# Patient Record
Sex: MALE | Race: ASIAN | Employment: FULL TIME | URBAN - METROPOLITAN AREA
[De-identification: names, ages, dates, MRNs, and addresses within clinical notes are randomized per-mention and may not be internally consistent; named-entity substitution may affect disease eponyms.]

---

## 2017-02-02 ENCOUNTER — OFFICE VISIT (OUTPATIENT)
Dept: URGENT CARE | Facility: RETAIL CLINIC | Age: 42
End: 2017-02-02

## 2017-02-02 VITALS
OXYGEN SATURATION: 97 % | TEMPERATURE: 98.2 F | SYSTOLIC BLOOD PRESSURE: 148 MMHG | DIASTOLIC BLOOD PRESSURE: 95 MMHG | HEART RATE: 64 BPM

## 2017-02-02 DIAGNOSIS — J01.90 ACUTE SINUSITIS WITH COEXISTING CONDITION REQUIRING PROPHYLACTIC TREATMENT: Primary | ICD-10-CM

## 2017-02-02 PROCEDURE — 99203 OFFICE O/P NEW LOW 30 MIN: CPT | Performed by: PHYSICIAN ASSISTANT

## 2017-02-02 RX ORDER — DOXYCYCLINE HYCLATE 100 MG
100 TABLET ORAL 2 TIMES DAILY
Qty: 14 TABLET | Refills: 0 | Status: SHIPPED | OUTPATIENT
Start: 2017-02-02 | End: 2017-02-09

## 2017-02-02 RX ORDER — PREDNISONE 20 MG/1
40 TABLET ORAL DAILY
Qty: 10 TABLET | Refills: 0 | Status: SHIPPED | OUTPATIENT
Start: 2017-02-02 | End: 2017-02-07

## 2017-02-02 NOTE — PROGRESS NOTES
Chief Complaint   Patient presents with     Sinus Problem     1 week; pressure left side of face, head; had similar symptoms 4 weeks ago, improved with no office visit, treatment     Otalgia     Left; aches     SUBJECTIVE:  Norman Pardo is a 41 year old male here with concerns about sinus infection.  He states onset of symptoms was 1 week ago.    Course of illness is worsening.   Severity moderate  He has had maxillary pressure as well as left face pain, left ear pain, headache.  Predisposing factors include history of recent illness but resolved on it's own. Quit smoking 1 month ago.  Recent treatment has included: Antihistamine, Decongestants and OTC meds    Past Medical History   Diagnosis Date     NO ACTIVE PROBLEMS      Current Outpatient Prescriptions   Medication Sig Dispense Refill     doxycycline (VIBRA-TABS) 100 MG tablet Take 1 tablet (100 mg) by mouth 2 times daily for 7 days 14 tablet 0     predniSONE (DELTASONE) 20 MG tablet Take 2 tablets (40 mg) by mouth daily for 5 days 10 tablet 0     Ibuprofen (ADVIL PO) Take 400 mg by mouth       Phenyleph-Doxylamine-DM-APAP (JUD-SELTZER PLS ALLERGY & CGH PO)        Social History   Substance Use Topics     Smoking status: Former Smoker -- 1.00 packs/day     Types: Cigarettes     Smokeless tobacco: Never Used     Alcohol Use: Yes      Comment: Ocassional     Allergies   Allergen Reactions     Erythromycin Swelling     Penicillins Swelling     ROS:  Review of systems negative except as stated above.    OBJECTIVE:  /95 mmHg  Pulse 64  Temp(Src) 98.2  F (36.8  C) (Oral)  SpO2 97%  GENERAL APPEARANCE: healthy, alert and no distress  EYES: PERRL, conjunctiva clear  HENT: Pain with palpation over Left frontal and maxillary sinuses. Ear canals normal TMs with mild serous effusions bilaterally. Nasal turbinates edematous bilaterally. Posterior pharynx is not erythematous.  NECK: supple, nontender, no lymphadenopathy  RESP: lungs clear to auscultation - no  rales, rhonchi or wheezes  CV: regular rates and rhythm, normal S1 S2, no murmur noted    ASSESSMENT:    ICD-10-CM    1. Acute sinusitis with coexisting condition requiring prophylactic treatment J01.90 doxycycline (VIBRA-TABS) 100 MG tablet     predniSONE (DELTASONE) 20 MG tablet     PLAN:   Patient Instructions   Doxycycline twice daily for 7 days.  Prednisone 40mg daily for 5 days.  Flonase 2 sprays in each nostril daily until symptoms resolve, then continue 1 spray in each nostril for at least 5 more days.  Take Tylenol or an NSAID such as ibuprofen or naproxen as needed for pain.  May use netti pot with bottled or distilled water and saline packets to flush sinuses.  Avoid sudafed or other decongestants as these raise your blood pressure.  Mucinex (guiafenesin) thins mucus and may help it to loosen more quickly  Saline drops or nasal sprays may loosen mucus.  Sit in the bathroom with the door closed and hot shower running to loosen mucus.  Contact primary care clinic if you do not have any relief from your symptoms after 10 days.  Present to emergency room for significantly increasing pain, persistent high fever >102F, swelling/redness around your eyes, changes in your vision or ability to move your eyes, altered mental status or a severe headache.    Follow up with primary care provider with any problems, questions or concerns or if symptoms worsen or fail to improve. Patient agreed to plan and verbalized understanding.    Dana Rivas PA-C  [facility] [place]

## 2017-02-02 NOTE — MR AVS SNAPSHOT
After Visit Summary   2/2/2017    Norman Pardo    MRN: [id number]           Patient Information     Date Of Birth          1975        Visit Information        Provider Department      2/2/2017 12:30 PM Anna Rivas PA-C [facility]        Today's Diagnoses     Acute sinusitis with coexisting condition requiring prophylactic treatment    -  1       Care Instructions    Doxycycline twice daily for 7 days.  Prednisone 40mg daily for 5 days.  Flonase 2 sprays in each nostril daily until symptoms resolve, then continue 1 spray in each nostril for at least 5 more days.  Take Tylenol or an NSAID such as ibuprofen or naproxen as needed for pain.  May use netti pot with bottled or distilled water and saline packets to flush sinuses.  Avoid sudafed or other decongestants as these raise your blood pressure.  Mucinex (guiafenesin) thins mucus and may help it to loosen more quickly  Saline drops or nasal sprays may loosen mucus.  Sit in the bathroom with the door closed and hot shower running to loosen mucus.  Contact primary care clinic if you do not have any relief from your symptoms after 10 days.  Present to emergency room for significantly increasing pain, persistent high fever >102F, swelling/redness around your eyes, changes in your vision or ability to move your eyes, altered mental status or a severe headache.        Follow-ups after your visit        Who to contact     You can reach your care team any time of the day by calling [phone].  Notification of test results:  If you have an abnormal lab result, we will notify you by phone as soon as possible.         Additional Information About Your Visit        MyChart Information     [employer] gives you secure access to your electronic health record. If you see a primary care provider, you can also send messages to your care team and make appointments. If you have questions, please call your primary care clinic.  If you  do not have a primary care provider, please call [phone] and they will assist you.        Care EveryWhere ID     This is your Care EveryWhere ID. This could be used by other organizations to access your [place] medical records  [id number]        Your Vitals Were     Pulse Temperature Pulse Oximetry             64 98.2  F (36.8  C) (Oral) 97%          Blood Pressure from Last 3 Encounters:   02/02/17 148/95   01/27/14 120/86   01/07/14 122/63    Weight from Last 3 Encounters:   01/27/14 169 lb (76.658 kg)   01/07/14 160 lb (72.576 kg)   01/25/13 159 lb (72.122 kg)              Today, you had the following     No orders found for display         Today's Medication Changes          These changes are accurate as of: 2/2/17 12:43 PM.  If you have any questions, ask your nurse or doctor.               Start taking these medicines.        Dose/Directions    doxycycline 100 MG tablet   Commonly known as:  VIBRA-TABS   Used for:  Acute sinusitis with coexisting condition requiring prophylactic treatment        Dose:  100 mg   Take 1 tablet (100 mg) by mouth 2 times daily for 7 days   Quantity:  14 tablet   Refills:  0       predniSONE 20 MG tablet   Commonly known as:  DELTASONE   Used for:  Acute sinusitis with coexisting condition requiring prophylactic treatment        Dose:  40 mg   Take 2 tablets (40 mg) by mouth daily for 5 days   Quantity:  10 tablet   Refills:  0            Where to get your medicines      These medications were sent to [id number] - [place], [place] - [address]  [address] [address]     Phone:  [phone]    - doxycycline 100 MG tablet  - predniSONE 20 MG tablet             Primary Care Provider Office Phone # Fax #    Meek Ellison [phone]       [place] [id number] [place] [address]        Thank you!     Thank you for choosing [facility]  for your care. Our goal is always to provide  you with excellent care. Hearing back from our patients is one way we can continue to improve our services. Please take a few minutes to complete the written survey that you may receive in the mail after your visit with us. Thank you!             Your Updated Medication List - Protect others around you: Learn how to safely use, store and throw away your medicines at www.disposemymeds.org.          This list is accurate as of: 2/2/17 12:43 PM.  Always use your most recent med list.                   Brand Name Dispense Instructions for use    ADVIL PO      Take 400 mg by mouth       SARA PLS ALLERGY & CGH PO          doxycycline 100 MG tablet    VIBRA-TABS    14 tablet    Take 1 tablet (100 mg) by mouth 2 times daily for 7 days       predniSONE 20 MG tablet    DELTASONE    10 tablet    Take 2 tablets (40 mg) by mouth daily for 5 days

## 2017-02-02 NOTE — PATIENT INSTRUCTIONS
Doxycycline twice daily for 7 days.  Prednisone 40mg daily for 5 days.  Flonase 2 sprays in each nostril daily until symptoms resolve, then continue 1 spray in each nostril for at least 5 more days.  Take Tylenol or an NSAID such as ibuprofen or naproxen as needed for pain.  May use netti pot with bottled or distilled water and saline packets to flush sinuses.  Avoid sudafed or other decongestants as these raise your blood pressure.  Mucinex (guiafenesin) thins mucus and may help it to loosen more quickly  Saline drops or nasal sprays may loosen mucus.  Sit in the bathroom with the door closed and hot shower running to loosen mucus.  Contact primary care clinic if you do not have any relief from your symptoms after 10 days.  Present to emergency room for significantly increasing pain, persistent high fever >102F, swelling/redness around your eyes, changes in your vision or ability to move your eyes, altered mental status or a severe headache.

## 2018-06-15 NOTE — PROGRESS NOTES
"  SUBJECTIVE:   Norman Pardo is a 42 year old male who presents to clinic today for the following health issues:    HPI  Would like to discuss his elevated /abnormal labs from his recent Insurance Physical.    Patient here today to evaluate his labs   Risk factors include: weight, alcohol use (not overuse, but sparingly), tobacco use.     Insurance levels for hyperlipidemia  ASCVD score of 1.8% 10 year risk.   The ASCVD Risk score (Ginnaanyi RODRIGUEZ Jr, et al., 2013) failed to calculate for the following reasons:    Cannot find a previous HDL lab    Cannot find a previous total cholesterol lab    Problem list and histories reviewed & adjusted, as indicated.  Additional history: as documented        Current Outpatient Prescriptions   Medication Sig Dispense Refill     Ibuprofen (ADVIL PO) Take 400 mg by mouth       Phenyleph-Doxylamine-DM-APAP (JUD-SELTZER PLS ALLERGY & CGH PO)        BP Readings from Last 3 Encounters:   06/18/18 110/78   02/02/17 (!) 148/95   01/27/14 120/86    Wt Readings from Last 3 Encounters:   06/18/18 178 lb (80.7 kg)   01/27/14 169 lb (76.7 kg)   01/07/14 160 lb (72.6 kg)                    ROS:  CONSTITUTIONAL: NEGATIVE for fever, chills, change in weight  ENT/MOUTH: NEGATIVE for ear, mouth and throat problems  RESP: NEGATIVE for significant cough or SOB  CV: NEGATIVE for chest pain, palpitations or peripheral edema    OBJECTIVE:     /78  Pulse 79  Temp 98.4  F (36.9  C)  Ht 5' 5\" (1.651 m)  Wt 178 lb (80.7 kg)  SpO2 97%  BMI 29.62 kg/m2  Body mass index is 29.62 kg/(m^2).  GENERAL: healthy, alert and no distress  HENT: ear canals and TM's normal, nose and mouth without ulcers or lesions  RESP: lungs clear to auscultation - no rales, rhonchi or wheezes  CV: regular rate and rhythm, normal S1 S2, no S3 or S4, no murmur, click or rub, no peripheral edema and peripheral pulses strong  SKIN: no suspicious lesions or rashes  NEURO: Normal strength and tone, mentation intact and speech " normal  PSYCH: mentation appears normal, affect normal/bright    Diagnostic Test Results:  Evaluated results from insurance company and scanned for our records.     ASSESSMENT/PLAN:       ICD-10-CM    1. Elevated liver enzymes R74.8 Lipid Profile (Chol, Trig, HDL, LDL calc)     Hemoglobin A1c     Hepatic panel     CANCELED: US Abdomen Complete   2. Hyperlipidemia LDL goal <100 E78.5 Lipid Profile (Chol, Trig, HDL, LDL calc)     Hemoglobin A1c   3. Prediabetes R73.03 Lipid Profile (Chol, Trig, HDL, LDL calc)     Hemoglobin A1c   4. Tobacco use disorder F17.200 Lipid Profile (Chol, Trig, HDL, LDL calc)     Hemoglobin A1c     CANCELED: US Abdomen Complete     - Schedule abdominal ultrasound to evaluate due to elevated liver enzymes. Hepatitis labs negative from insurance company for Hepatitis B and C.   - Follow up for repeat labs in 3 months.   - Work on diet and exercise, discussed the relationship between this and elevated liver enzymes, prediabetes and cholesterol.   - If any abdominal pain, fevers, nausea or change in symptoms return to clinic- patient currently asymptomatic of elevated liver enzymes.   - Avoid tylenol and alcohol to protect your liver.   - Lowering the amount of sugar,alcohol and sweets in the diet helps to control your cholesterol. Avoid sugar pop. Avoiding overeating at meal time helps as well. Exercise and weight loss helps too.   - Please try taking 1 grams of fish oil ( generally comes in 1 gram capsules ) once daily. Make sure you are getting a lot of fiber in your diet ( fruits and vegetables). Consider taking 2 Tbsp of Ground Flax Seed for a fiber supplement to help as well.  The patient indicates understanding of these issues and agrees with the plan.    Patient Instructions   - Schedule abdominal ultrasound  - Follow up for repeat labs in 3 months.   - Work on diet and exercise  - If any abdominal pain, fevers, nausea or change in symptoms return to clinic.   - Avoid tylenol and alcohol  to protect your liver  - Lowering the amount of sugar,alcohol and sweets in the diet helps to control your cholesterol. Avoid sugar pop. Avoiding overeating at meal time helps as well. Exercise and weight loss helps too.   - Please try taking 1 grams of fish oil ( generally comes in 1 gram capsules ) once daily. Make sure you are getting a lot of fiber in your diet ( fruits and vegetables). Consider taking 2 Tbsp of Ground Flax Seed for a fiber supplement to help as well.    KIKO Lanier CNP, APRN CNP  [facility]

## 2018-06-18 ENCOUNTER — OFFICE VISIT (OUTPATIENT)
Dept: FAMILY MEDICINE | Facility: OTHER | Age: 43
End: 2018-06-18

## 2018-06-18 VITALS
DIASTOLIC BLOOD PRESSURE: 78 MMHG | WEIGHT: 178 LBS | TEMPERATURE: 98.4 F | OXYGEN SATURATION: 97 % | HEIGHT: 65 IN | BODY MASS INDEX: 29.66 KG/M2 | HEART RATE: 79 BPM | SYSTOLIC BLOOD PRESSURE: 110 MMHG

## 2018-06-18 DIAGNOSIS — F17.200 TOBACCO USE DISORDER: ICD-10-CM

## 2018-06-18 DIAGNOSIS — R73.03 PREDIABETES: ICD-10-CM

## 2018-06-18 DIAGNOSIS — E78.5 HYPERLIPIDEMIA LDL GOAL <100: ICD-10-CM

## 2018-06-18 DIAGNOSIS — R74.8 ELEVATED LIVER ENZYMES: Primary | ICD-10-CM

## 2018-06-18 PROCEDURE — 99214 OFFICE O/P EST MOD 30 MIN: CPT | Performed by: NURSE PRACTITIONER

## 2018-06-18 ASSESSMENT — PAIN SCALES - GENERAL: PAINLEVEL: NO PAIN (0)

## 2018-06-18 NOTE — MR AVS SNAPSHOT
After Visit Summary   6/18/2018    Norman Pardo    MRN: [id number]           Patient Information     Date Of Birth          1975        Visit Information        Provider Department      6/18/2018 5:40 PM Bertha Serna APRN CNP [facility]        Today's Diagnoses     Elevated liver enzymes    -  1    Hyperlipidemia LDL goal <100        Prediabetes        Tobacco use disorder          Care Instructions    - Schedule abdominal ultrasound  - Follow up for repeat labs in 3 months.   - Work on diet and exercise  - If any abdominal pain, fevers, nausea or change in symptoms return to clinic.   - Avoid tylenol and alcohol to protect your liver  - Lowering the amount of sugar,alcohol and sweets in the diet helps to control your cholesterol. Avoid sugar pop. Avoiding overeating at meal time helps as well. Exercise and weight loss helps too.   - Please try taking 1 grams of fish oil ( generally comes in 1 gram capsules ) once daily. Make sure you are getting a lot of fiber in your diet ( fruits and vegetables). Consider taking 2 Tbsp of Ground Flax Seed for a fiber supplement to help as well.    KIKO Lanier CNP            Follow-ups after your visit        Follow-up notes from your care team     Return in about 3 months (around 9/18/2018), or labs and follow up visit. .      Future tests that were ordered for you today     Open Future Orders        Priority Expected Expires Ordered    Lipid Profile (Chol, Trig, HDL, LDL calc) Routine 9/18/2018 6/18/2019 6/18/2018    Hemoglobin A1c Routine 9/18/2018 6/18/2019 6/18/2018    Hepatic panel Routine 9/18/2018 6/18/2019 6/18/2018    US Abdomen Complete Routine  6/18/2019 6/18/2018            Who to contact     If you have questions or need follow up information about today's clinic visit or your schedule please contact [facility] directly at [phone].  Normal or non-critical lab and imaging results will be  "communicated to you by MyChart, letter or phone within 4 business days after the clinic has received the results. If you do not hear from us within 7 days, please contact the clinic through Dials or phone. If you have a critical or abnormal lab result, we will notify you by phone as soon as possible.  Submit refill requests through Dials or call your pharmacy and they will forward the refill request to us. Please allow 3 business days for your refill to be completed.          Additional Information About Your Visit        Dials Information     D[employer] gives you secure access to your electronic health record. If you see a primary care provider, you can also send messages to your care team and make appointments. If you have questions, please call your primary care clinic.  If you do not have a primary care provider, please call [phone] and they will assist you.        Care EveryWhere ID     This is your Care EveryWhere ID. This could be used by other organizations to access your [place] medical records  [id number]        Your Vitals Were     Pulse Temperature Height Pulse Oximetry BMI (Body Mass Index)       79 98.4  F (36.9  C) 5' 5\" (1.651 m) 97% 29.62 kg/m2        Blood Pressure from Last 3 Encounters:   06/18/18 110/78   02/02/17 (!) 148/95   01/27/14 120/86    Weight from Last 3 Encounters:   06/18/18 178 lb (80.7 kg)   01/27/14 169 lb (76.7 kg)   01/07/14 160 lb (72.6 kg)               Primary Care Provider Office Phone # Fax #    Meek Ellison [phone]       [address] [facility] [address]        Equal Access to Services     JAMARCUS KENDALL : Hadhector Pickett, tj dover, qaybsamra gerard. So [facility] [phone].    ATENCIÓN: Si habla español, tiene a gay disposición servicios gratuitos de asistencia lingüística. Khadijah al [phone].    We comply with applicable federal civil rights laws and " [place] laws. We do not discriminate on the basis of race, color, national origin, age, disability, sex, sexual orientation, or gender identity.            Thank you!     Thank you for choosing [facility]  for your care. Our goal is always to provide you with excellent care. Hearing back from our patients is one way we can continue to improve our services. Please take a few minutes to complete the written survey that you may receive in the mail after your visit with us. Thank you!             Your Updated Medication List - Protect others around you: Learn how to safely use, store and throw away your medicines at www.disposemymeds.org.          This list is accurate as of 6/18/18  6:12 PM.  Always use your most recent med list.                   Brand Name Dispense Instructions for use Diagnosis    ADVIL PO      Take 400 mg by mouth        SARA PLS ALLERGY & CGH PO

## 2018-06-18 NOTE — PATIENT INSTRUCTIONS
- Schedule abdominal ultrasound  - Follow up for repeat labs in 3 months.   - Work on diet and exercise  - If any abdominal pain, fevers, nausea or change in symptoms return to clinic.   - Avoid tylenol and alcohol to protect your liver  - Lowering the amount of sugar,alcohol and sweets in the diet helps to control your cholesterol. Avoid sugar pop. Avoiding overeating at meal time helps as well. Exercise and weight loss helps too.   - Please try taking 1 grams of fish oil ( generally comes in 1 gram capsules ) once daily. Make sure you are getting a lot of fiber in your diet ( fruits and vegetables). Consider taking 2 Tbsp of Ground Flax Seed for a fiber supplement to help as well.    KIKO Lanier CNP

## 2018-06-26 ENCOUNTER — RADIANT APPOINTMENT (OUTPATIENT)
Dept: ULTRASOUND IMAGING | Facility: OTHER | Age: 43
End: 2018-06-26
Attending: NURSE PRACTITIONER

## 2018-06-26 DIAGNOSIS — R74.8 ELEVATED LIVER ENZYMES: ICD-10-CM

## 2018-06-26 DIAGNOSIS — F17.200 TOBACCO USE DISORDER: ICD-10-CM

## 2018-06-26 PROCEDURE — 76705 ECHO EXAM OF ABDOMEN: CPT

## 2018-06-27 ENCOUNTER — TELEPHONE (OUTPATIENT)
Dept: FAMILY MEDICINE | Facility: OTHER | Age: 43
End: 2018-06-27

## 2018-06-27 NOTE — TELEPHONE ENCOUNTER
----- Message from KIKO Santana CNP sent at 6/27/2018 11:01 AM CDT -----  Please let patient know that his ultrasound showed fatty liver, which is likely the cause of his elevated liver enzymes. Recommend changes as we discussed at OV with diet, exercise, limiting tylenol intake, alcohol intake, recheck liver enzymes in 3months along with other future labs as ordered for September. Ultrasound also showed a simple cyst along the left kidney, it does not look like the radiologist recommended further imaging for this cyst as it was found to be benign at this time.     KIKO Lanier CNP

## 2018-12-19 ENCOUNTER — NURSE TRIAGE (OUTPATIENT)
Dept: NURSING | Facility: CLINIC | Age: 43
End: 2018-12-19

## 2018-12-19 NOTE — TELEPHONE ENCOUNTER
Reason for Disposition    [1] Can't control passage of urine (i.e., urinary incontinence, wetting self) AND [2] present > 2 weeks    Additional Information    Negative: Shock suspected (e.g., cold/pale/clammy skin, too weak to stand, low BP, rapid pulse)    Negative: Sounds like a life-threatening emergency to the triager    Negative: Followed a genital area injury    Negative: Followed a genital area injury (penis, scrotum)    Negative: Vaginal discharge    Negative: Pus (white, yellow) or bloody discharge from end of penis    Negative: [1] Taking antibiotic for urinary tract infection (UTI) AND [2] female    Negative: [1] Taking antibiotic for urinary tract infection (UTI) AND [2] male    Negative: [1] Discomfort (pain, burning or stinging) when passing urine AND [2] pregnant    Negative: [1] Discomfort (pain, burning or stinging) when passing urine AND [2] postpartum < 1 month    Negative: [1] Discomfort (pain, burning or stinging) when passing urine AND [2] female    Negative: [1] Discomfort (pain, burning or stinging) when passing urine AND [2] male    Negative: Pain or itching in the vulvar area    Negative: Pain in scrotum is main symptom    Negative: Blood in the urine is main symptom    Negative: Symptoms arising from use of a urinary catheter (Ordoñez or Coude)    Negative: [1] Unable to urinate (or only a few drops) > 4 hours AND     [2] bladder feels very full (e.g., palpable bladder or strong urge to urinate)    Negative: [1] Decreased urination and [2] drinking very little AND [2] dehydration suspected (e.g., dark urine, no urine > 12 hours, very dry mouth, very lightheaded)    Negative: Patient sounds very sick or weak to the triager    Negative: Fever > 100.5 F (38.1 C)    Negative: Side (flank) or lower back pain present    Negative: [1] Can't control passage of urine (i.e., urinary incontinence) AND [2] new onset (< 2 weeks) or worsening    Negative: Urinating more frequently than usual (i.e.,  "frequency)    Negative: Bad or foul-smelling urine    Answer Assessment - Initial Assessment Questions  1. SYMPTOM: \"What's the main symptom you're concerned about?\" (e.g., frequency, incontinence)      bedwetting  2. ONSET: \"When did the  ________  start?\"      2 weeks  3. PAIN: \"Is there any pain?\" If so, ask: \"How bad is it?\" (Scale: 1-10; mild, moderate, severe)      Sensitive testicles  4. CAUSE: \"What do you think is causing the symptoms?\"      Not sure  5. OTHER SYMPTOMS: \"Do you have any other symptoms?\" (e.g., fever, flank pain, blood in urine, pain with urination)      no  6. PREGNANCY: \"Is there any chance you are pregnant?\" \"When was your last menstrual period?\"      no    Protocols used: URINARY SYMPTOMS-ADULT-AH      "

## 2018-12-20 NOTE — PROGRESS NOTES
"  SUBJECTIVE:   Norman Pardo is a 43 year old male who presents to clinic today for the following health issues:      HPI  Genitourinary - Male   Incontinence   Onset:   2 weeks    Description:   Dysuria (painful urination): no   Hematuria (blood in urine): no   Frequency: no   Are you urinating at night : YES  Hesitancy (delay in urine): no   Retention (unable to empty): no   Decrease in urinary flow: no   Incontinence: YES   Sometimes his bed gets very wet    Progression of Symptoms:  worsening    Accompanying Signs & Symptoms:  Fever: no   Back/Flank pain: no   Urethral discharge: no   Testicle lumps/masses/pain:    Testicles are \"more sensitive\" per patient  Nausea and/or vomiting: no   Abdominal pain: no     History:   History of frequent UTI's: no   History of kidney stones: no   History of hernias: no   Personal or Family history of Prostate problems: not applicable  Sexually active: YES    Precipitating factors:      It happens at night    Alleviating factors:  None    Problem list and histories reviewed & adjusted, as indicated.  Additional history: as documented        Current Outpatient Medications   Medication Sig Dispense Refill     Ibuprofen (ADVIL PO) Take 400 mg by mouth       Phenyleph-Doxylamine-DM-APAP (JUD-GABBY PLS ALLERGY & [facility] PO)          ROS:  CONSTITUTIONAL: NEGATIVE for fever, chills, change in weight  RESP: NEGATIVE for significant cough or SOB  CV: NEGATIVE for chest pain, palpitations or peripheral edema    OBJECTIVE:     /90   Pulse 72   Temp 98.2  F (36.8  C)   Resp 16   Wt 79.8 kg (176 lb)   BMI 29.29 kg/m    Body mass index is 29.29 kg/m .  Physical Exam   Constitutional: Vital signs are normal.   Cardiovascular: Regular rhythm and normal heart sounds.   Pulmonary/Chest: Effort normal and breath sounds normal.   Abdominal: Soft. Normal appearance and bowel sounds are normal. There is no tenderness. Hernia confirmed negative in the right inguinal area and confirmed " negative in the left inguinal area.   Genitourinary: Rectum normal, prostate normal and penis normal. Prostate is not enlarged and not tender. Right testis shows no mass, no swelling and no tenderness. Left testis shows no mass, no swelling and no tenderness.   Lymphadenopathy: No inguinal adenopathy noted on the right or left side.         Diagnostic Test Results:  Results for orders placed or performed in visit on 12/21/18 (from the past 24 hour(s))   *UA reflex to Microscopic and Culture ([facility] (except [place] and [place])   Result Value Ref Range    Color Urine Yellow     Appearance Urine Clear     Glucose Urine Negative NEG^Negative mg/dL    Bilirubin Urine Negative NEG^Negative    Ketones Urine Negative NEG^Negative mg/dL    Specific Gravity Urine 1.030 1.003 - 1.035    Blood Urine Negative NEG^Negative    pH Urine 5.5 5.0 - 7.0 pH    Protein Albumin Urine Negative NEG^Negative mg/dL    Urobilinogen Urine 0.2 0.2 - 1.0 EU/dL    Nitrite Urine Negative NEG^Negative    Leukocyte Esterase Urine Negative NEG^Negative    Source Unspecified Urine        ASSESSMENT/PLAN:       1. Nocturnal enuresis  - UA negative for any signs of infection and hematuria  -Recommend further evaluation with urology for unexplained nocturnal enuresis  - Discussed bedtime habits as it relates to bedwetting.   - PSA also drawn today.   - Assisted with appointment for Urology.   - Denies STD testing.   - *UA reflex to Microscopic and Culture ([facility] (except [place] and [place])  - UROLOGY ADULT REFERRAL    2. Prostate cancer screening    - PSA, screen  - UROLOGY ADULT REFERRAL    The patient indicates understanding of these issues and agrees with the plan.    There are no Patient Instructions on file for this visit.    KIKO [facility]

## 2018-12-21 ENCOUNTER — OFFICE VISIT (OUTPATIENT)
Dept: FAMILY MEDICINE | Facility: OTHER | Age: 43
End: 2018-12-21

## 2018-12-21 VITALS
RESPIRATION RATE: 16 BRPM | WEIGHT: 176 LBS | BODY MASS INDEX: 29.29 KG/M2 | DIASTOLIC BLOOD PRESSURE: 90 MMHG | SYSTOLIC BLOOD PRESSURE: 128 MMHG | HEART RATE: 72 BPM | TEMPERATURE: 98.2 F

## 2018-12-21 DIAGNOSIS — Z12.5 PROSTATE CANCER SCREENING: ICD-10-CM

## 2018-12-21 DIAGNOSIS — N39.44 NOCTURNAL ENURESIS: Primary | ICD-10-CM

## 2018-12-21 LAB
ALBUMIN UR-MCNC: NEGATIVE MG/DL
APPEARANCE UR: CLEAR
BILIRUB UR QL STRIP: NEGATIVE
COLOR UR AUTO: YELLOW
GLUCOSE UR STRIP-MCNC: NEGATIVE MG/DL
HGB UR QL STRIP: NEGATIVE
KETONES UR STRIP-MCNC: NEGATIVE MG/DL
LEUKOCYTE ESTERASE UR QL STRIP: NEGATIVE
NITRATE UR QL: NEGATIVE
PH UR STRIP: 5.5 PH (ref 5–7)
PSA SERPL-ACNC: 0.43 UG/L (ref 0–4)
SOURCE: NORMAL
SP GR UR STRIP: 1.03 (ref 1–1.03)
UROBILINOGEN UR STRIP-ACNC: 0.2 EU/DL (ref 0.2–1)

## 2018-12-21 PROCEDURE — 99213 OFFICE O/P EST LOW 20 MIN: CPT | Performed by: NURSE PRACTITIONER

## 2018-12-21 PROCEDURE — 36415 COLL VENOUS BLD VENIPUNCTURE: CPT | Performed by: NURSE PRACTITIONER

## 2018-12-21 PROCEDURE — 81003 URINALYSIS AUTO W/O SCOPE: CPT | Performed by: NURSE PRACTITIONER

## 2018-12-21 PROCEDURE — G0103 PSA SCREENING: HCPCS | Performed by: NURSE PRACTITIONER

## 2018-12-21 ASSESSMENT — PAIN SCALES - GENERAL: PAINLEVEL: NO PAIN (0)

## 2019-01-04 ENCOUNTER — OFFICE VISIT (OUTPATIENT)
Dept: UROLOGY | Facility: CLINIC | Age: 44
End: 2019-01-04

## 2019-01-04 VITALS — DIASTOLIC BLOOD PRESSURE: 84 MMHG | SYSTOLIC BLOOD PRESSURE: 128 MMHG

## 2019-01-04 DIAGNOSIS — N39.44 NOCTURNAL ENURESIS: Primary | ICD-10-CM

## 2019-01-04 PROCEDURE — 51798 US URINE CAPACITY MEASURE: CPT | Performed by: UROLOGY

## 2019-01-04 PROCEDURE — 99242 OFF/OP CONSLTJ NEW/EST SF 20: CPT | Mod: 25 | Performed by: UROLOGY

## 2019-01-04 NOTE — PROGRESS NOTES
S: Norman Pardo is a pleasant  43 year old male who was requested to be seen by  Meek Ellison for a consult with regard to patient's urinary complaints.  Patient complains of bedwetting for the last 2 weeks.  He has no obstructive or irritative voiding symptoms.  Recent UA was normal.    His recent PSA was found to be   PSA   Date Value Ref Range Status   12/21/2018 0.43 0 - 4 ug/L Final     Comment:     Assay Method:  Chemiluminescence using Siemens Vista analyzer   He is .  His wife has observe some apnea during sleep.   He snores.    No current outpatient medications on file.     Allergies   Allergen Reactions     Erythromycin Swelling     Penicillins Swelling     Past Medical History:   Diagnosis Date     NO ACTIVE PROBLEMS      Past Surgical History:   Procedure Laterality Date     HC INJ EPIDURAL CERVICAL/THORACIC W/WO CONTRAST  2010      Family History   Adopted: Yes     He does not have a family history of prostate cancer.  Social History     Socioeconomic History     Marital status:      Spouse name: Julieta     Number of children: 2     Years of education: 12     Highest education level: None   Social Needs     Financial resource strain: None     Food insecurity - worry: None     Food insecurity - inability: None     Transportation needs - medical: None     Transportation needs - non-medical: None   Occupational History     None   Tobacco Use     Smoking status: Former Smoker     Packs/day: 1.00     Types: Cigarettes     Smokeless tobacco: Current User     Types: Chew   Substance and Sexual Activity     Alcohol use: Yes     Comment: Ocassional     Drug use: No     Sexual activity: Yes     Partners: Female   Other Topics Concern      Service Yes     Comment: [employer]     Blood Transfusions No     Caffeine Concern Not Asked     Occupational Exposure Not Asked     Hobby Hazards Not Asked     Sleep Concern No     Stress Concern No     Weight Concern No     Special Diet Not Asked      Back Care No     Exercise Yes     Comment: Active with construction     Bike Helmet Not Asked     Seat Belt Yes     Self-Exams Not Asked     Parent/sibling w/ CABG, MI or angioplasty before 65F 55M? No   Social History Narrative     None        REVIEW OF SYSTEMS  =================  C: NEGATIVE for fever, chills, change in weight  I: NEGATIVE for worrisome rashes, moles or lesions  E/M: NEGATIVE for ear, mouth and throat problems  R: NEGATIVE for significant cough or SHORTNESS OF BREATH  CV:  NEGATIVE for chest pain, palpitations or peripheral edema  GI: NEGATIVE for nausea, abdominal pain, heartburn, or change in bowel habits  NEURO: NEGATIVE numbness/weakness  : see HPI  PSYCH: NEGATIVE depression/anxiety  LYmph: no new enlarged lymph nodes  Ortho: no new trauma/movements           O: Exam:/84    Constitutional: healthy, alert and no distress  Cardiovascular: negative, PMI normal.   Respiratory: negative, no evidence of respiratory distress  Gastrointestinal: Abdomen soft, non-tender. BS normal. No masses, organomegaly  : penis no discharge. Testis no masses.  No scrotal skin lesion.  Prostate small.  Musculoskeletal: extremities normal- no gross deformities noted, gait normal and normal muscle tone  Skin: no suspicious lesions or rashes  Neurologic: Alert and oriented  Psychiatric: mentation appears normal. and affect normal/bright  Hematologic/Lymphatic/Immunologic: normal ant/post cervical, axillary, supraclavicular and inguinal nodes    Assessment/Plan:   (N39.44) Nocturnal enuresis  (primary encounter diagnosis)  Comment: bladder scan < 50 ml.  UA neg.  Plan: suspect sleep apnea           Referral for sleep study next.

## 2019-01-09 PROBLEM — N39.44 NOCTURNAL ENURESIS: Status: ACTIVE | Noted: 2019-01-09

## 2019-01-10 ENCOUNTER — OFFICE VISIT (OUTPATIENT)
Dept: SLEEP MEDICINE | Facility: CLINIC | Age: 44
End: 2019-01-10

## 2019-01-10 VITALS
HEART RATE: 88 BPM | HEIGHT: 65 IN | OXYGEN SATURATION: 95 % | WEIGHT: 182 LBS | DIASTOLIC BLOOD PRESSURE: 97 MMHG | SYSTOLIC BLOOD PRESSURE: 144 MMHG | BODY MASS INDEX: 30.32 KG/M2

## 2019-01-10 DIAGNOSIS — G47.9 DISTURBANCE IN SLEEP BEHAVIOR: ICD-10-CM

## 2019-01-10 DIAGNOSIS — R06.89 DYSPNEA AND RESPIRATORY ABNORMALITY: ICD-10-CM

## 2019-01-10 DIAGNOSIS — R09.81 CHRONIC NASAL CONGESTION: Chronic | ICD-10-CM

## 2019-01-10 DIAGNOSIS — R06.00 DYSPNEA AND RESPIRATORY ABNORMALITY: ICD-10-CM

## 2019-01-10 DIAGNOSIS — G47.10 ORGANIC HYPERSOMNIA: ICD-10-CM

## 2019-01-10 DIAGNOSIS — N39.44 NOCTURNAL ENURESIS: Primary | ICD-10-CM

## 2019-01-10 PROCEDURE — 99244 OFF/OP CNSLTJ NEW/EST MOD 40: CPT | Performed by: INTERNAL MEDICINE

## 2019-01-10 SDOH — HEALTH STABILITY: MENTAL HEALTH: HOW OFTEN DO YOU HAVE A DRINK CONTAINING ALCOHOL?: MONTHLY OR LESS

## 2019-01-10 ASSESSMENT — MIFFLIN-ST. JEOR: SCORE: 1657.43

## 2019-01-10 NOTE — PATIENT INSTRUCTIONS
Your BMI is Body mass index is 30.29 kg/m .  Weight management is a personal decision.  If you are interested in exploring weight loss strategies, the following discussion covers the approaches that may be successful. Body mass index (BMI) is one way to tell whether you are at a healthy weight, overweight, or obese. It measures your weight in relation to your height.  A BMI of 18.5 to 24.9 is in the healthy range. A person with a BMI of 25 to 29.9 is considered overweight, and someone with a BMI of 30 or greater is considered obese. More than two-thirds of American adults are considered overweight or obese.  Being overweight or obese increases the risk for further weight gain. Excess weight may lead to heart disease and diabetes.  Creating and following plans for healthy eating and physical activity may help you improve your health.  Weight control is part of healthy lifestyle and includes exercise, emotional health, and healthy eating habits. Careful eating habits lifelong are the mainstay of weight control. Though there are significant health benefits from weight loss, long-term weight loss with diet alone may be very difficult to achieve- studies show long-term success with dietary management in less than 10% of people. Attaining a healthy weight may be especially difficult to achieve in those with severe obesity. In some cases, medications, devices and surgical management might be considered.  What can you do?  If you are overweight or obese and are interested in methods for weight loss, you should discuss this with your provider.     Consider reducing daily calorie intake by 500 calories.     Keep a food journal.     Avoiding skipping meals, consider cutting portions instead.    Diet combined with exercise helps maintain muscle while optimizing fat loss. Strength training is particularly important for building and maintaining muscle mass. Exercise helps reduce stress, increase energy, and improves fitness.  Increasing exercise without diet control, however, may not burn enough calories to loose weight.       Start walking three days a week 10-20 minutes at a time    Work towards walking thirty minutes five days a week     Eventually, increase the speed of your walking for 1-2 minutes at time    In addition, we recommend that you review healthy lifestyles and methods for weight loss available through the National Institutes of Health patient information sites:  http://win.niddk.nih.gov/publications/index.htm    And look into health and wellness programs that may be available through your health insurance provider, employer, local community center, or jonas club.    Weight management plan: Patient was referred to their PCP to discuss a diet and exercise plan.

## 2019-01-10 NOTE — NURSING NOTE
"Chief Complaint   Patient presents with     Sleep Problem     consult- i wet my pants in bed when I sleep at night. Multiple times.        Initial BP (!) 144/97   Pulse 88   Ht 1.651 m (5' 5\")   Wt 82.6 kg (182 lb)   SpO2 95%   BMI 30.29 kg/m   Estimated body mass index is 30.29 kg/m  as calculated from the following:    Height as of this encounter: 1.651 m (5' 5\").    Weight as of this encounter: 82.6 kg (182 lb).    Medication Reconciliation: complete    Neck circumference: 16 inches / 41 centimeters.        "
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
## 2019-01-13 ENCOUNTER — THERAPY VISIT (OUTPATIENT)
Dept: SLEEP MEDICINE | Facility: CLINIC | Age: 44
End: 2019-01-13

## 2019-01-13 DIAGNOSIS — G47.33 OSA (OBSTRUCTIVE SLEEP APNEA): Chronic | ICD-10-CM

## 2019-01-13 DIAGNOSIS — G47.10 ORGANIC HYPERSOMNIA: ICD-10-CM

## 2019-01-13 DIAGNOSIS — R06.00 DYSPNEA AND RESPIRATORY ABNORMALITY: ICD-10-CM

## 2019-01-13 DIAGNOSIS — R06.89 DYSPNEA AND RESPIRATORY ABNORMALITY: ICD-10-CM

## 2019-01-13 DIAGNOSIS — R09.81 CHRONIC NASAL CONGESTION: Chronic | ICD-10-CM

## 2019-01-13 DIAGNOSIS — G47.9 DISTURBANCE IN SLEEP BEHAVIOR: ICD-10-CM

## 2019-01-13 DIAGNOSIS — N39.44 NOCTURNAL ENURESIS: ICD-10-CM

## 2019-01-13 PROCEDURE — 95811 POLYSOM 6/>YRS CPAP 4/> PARM: CPT | Performed by: INTERNAL MEDICINE

## 2019-01-14 PROBLEM — G47.33 OSA (OBSTRUCTIVE SLEEP APNEA): Chronic | Status: ACTIVE | Noted: 2019-01-14

## 2019-01-14 LAB — SLPCOMP: NORMAL

## 2019-01-14 NOTE — PROGRESS NOTES
Completed a split night PSG per provider order.    Preliminary AHI >30.  A final therapeutic PAP pressure was not achieved.    Supine REM was not achieved on therapeutic pressure.    Patient reports feeling not refreshed in AM.

## 2019-01-14 NOTE — PROCEDURES
" SLEEP STUDY INTERPRETATION  SPLIT NIGHT STUDY      Patient: LETICIA DAWN  YOB: 1975  Study Date: 1/13/2019  MRN: [id number]  Referring Provider: Gualberto Christine MD  Ordering Provider: jasmin Alicea MD    Indications for Polysomnography: The patient is a 43 y old Male who is 5' 5\" and weighs 182.0 lbs. His BMI is 30.3, [place] sleepiness scale 23.0 and neck circumference is 41.0 cm. A diagnostic polysomnogram was performed to evaluate for Nocturnal enuresis x 2 weeks, snoring, witnessed apneas, excessive daytime sleepiness (ESS 23), obesity, narrowed oropharynx, tonsillar enlargement, nasal congestion. After 168.5 minutes of sleep time the patient exhibited sufficient respiratory events qualifying him for a CPAP trial which was then initiated.    Polysomnogram Data: A full night polysomnogram recorded the standard physiologic parameters including EEG, EOG, EMG, ECG, nasal and oral airflow. Respiratory parameters of chest and abdominal movements were recorded with respiratory inductance plethysmography. Oxygen saturation was recorded by pulse oximetry.  Hypopnea scoring rule used: 1B 4%    Diagnostic PSG  Sleep Architecture:   The total recording time of the polysomnogram was 191.6 minutes. The total sleep time was 168.5 minutes. Sleep latency was decreased at 0.0 minutes without the use of a sleep aid. REM latency was 120.6 minutes. Arousal index was increased at 85.5 arousals per hour. Sleep efficiency was normal at 88.0%. Wake after sleep onset was 14.0 minutes. The patient spent 2.7% of total sleep time in Stage N1, 73.0% in Stage N2, 13.4% in Stage N3, and 11.0% in REM. Time in REM supine was 0 minutes.    Respiration:     Events ? The polysomnogram revealed a presence of 84 obstructive, 6 central, and 6 mixed apneas resulting in an apnea index of 34.2 events per hour. There were 99 obstructive hypopneas and 0 central hypopneas resulting in an obstructive hypopnea index of 35.3 and central " hypopnea index of 0 events per hour. The combined apnea/hypopnea index was 69.4 events per hour (central apnea/hypopnea index was 2.1 events per hour).  The REM AHI was 71.4 events per hour. The supine AHI was 94.3 events per hour. The RERA index was 11.4 events per hour. The RDI was 80.8 events per hour.    Snoring - was reported as loud.    Respiratory rate and pattern - was notable for normal respiratory rate and pattern.    Sustained Sleep Associated Hypoventilation - Transcutaneous carbon dioxide monitoring was not used, however significant hypoventilation was not suggested by oximetry    Sleep Associated Hypoxemia - (Greater than 5 minutes O2 sat at or below 88%) was present. Baseline oxygen saturation was 92.0%. Lowest oxygen saturation was 66.5%. Time spent less than or equal to 88% was 23.8 minutes. Time spent less than or equal to 89% was 30.3 minutes.     Treatment PSG  Sleep Architecture:   At 02:31:09 AM the patient was placed on PAP treatment and was titrated at pressures ranging from CPAP 5 cmH2O up to CPAP 11 cmH2O. The total recording time of the treatment portion of the study was 183.7 minutes. The total sleep time was 171.0 minutes. During the treatment portion of the study the sleep latency was 7.5 minutes. REM latency was 30.0 minutes. Arousal index was 23.2 arousals per hour. Sleep efficiency was 93.1%. Wake after sleep onset was 2.0 minutes. The patient spent 1.8% of total sleep time in Stage N1, 35.4% in Stage N2, 39.5% in Stage N3, and 23.4% in REM. Time in REM supine was 11.5 minutes.     Respiration:    The final pressure was CPAP 11 cmH2O with an AHI of 7.0 events per hour. Time in REM supine on final pressure was 0 minutes. Lateral REM was seen ay 8cmH20    This titration was considered adequate (residual AHI with 75% decrease without REM?supine sleep at final pressure).    Movement Activity:     Periodic Limb Movements  o During the diagnostic portion of the study, there were 0 PLMs  recorded.   o During the treatment portion of the study, there were 23 PLMs recorded. The PLM index was 8.1 movements per hour. The PLM Arousal Index was 1.4 per hour.    REM EMG Activity - Excessive transient/sustained muscle activity was not present.    Nocturnal Behavior - Abnormal sleep related behaviors were not noted. He did take off wires on awakening at the end of the study    Bruxism - None apparent.    Cardiac Summary:  Arrhythmias were not noted.      Assessment:     Severe obstructive sleep apnea with sleep-related hypoxemia   Recommendations:    Treatment of ROSI with Auto?titrating PAP therapy with a range of 8 cmH2O to 18 cmH2O. Recommend clinical follow up with sleep management team, including review of compliance measures.    Patient may be a candidate for dental appliance through referral to Sleep Dentistry for the treatment of obstructive sleep apnea and/or socially disruptive snoring.    If devices are not acceptable or effective, patient may benefit from evaluation of possible surgical options for the treatment of obstructive sleep apnea and/or socially disruptive snoring. If he is interested, would recommend referral to specialized ENT?Sleep provider.    Advice regarding the risks of drowsy driving.    Suggest optimizing sleep schedule and avoiding sleep deprivation.    Pharmacologic therapy should be used for management of restless legs syndrome only if present and clinically indicated and not based on the presence of periodic limb movements alone.    Diagnostic Codes:   Obstructive Sleep Apnea G47.33  Sleep Hypoxemia/Hypoventilation G47.36            _____________________________________   Electronically Signed By: Dieter Alicea MD 1/14/19         Range(%) Time in range (min)   0.0 - 88.0 23.8   0.0 - 89.0 30.3       Stage Min(mm Hg) Max(mm Hg)   Wake - -   NREM(1+2+3) - -   REM - -         Range(mmHg) Time in range (min)   55.0 - 100.0 -   Excluded data <20.0 & >65.0 192.0

## 2019-01-16 ENCOUNTER — VIRTUAL VISIT (OUTPATIENT)
Dept: SLEEP MEDICINE | Facility: CLINIC | Age: 44
End: 2019-01-16
Payer: COMMERCIAL

## 2019-01-16 DIAGNOSIS — G47.33 OSA (OBSTRUCTIVE SLEEP APNEA): Primary | ICD-10-CM

## 2019-01-16 PROCEDURE — 99443 ZZC PHYSICIAN TELEPHONE EVALUATION 21-30 MIN: CPT | Performed by: INTERNAL MEDICINE

## 2019-01-16 NOTE — TELEPHONE ENCOUNTER
FUTURE VISIT INFORMATION      FUTURE VISIT INFORMATION:    Date: 1/17/19    Time: 10AM    Location: [facility]  REFERRAL INFORMATION:    Referring provider:  Dr. Dieter Alicea    Referring providers clinic:  [facility]    Reason for visit/diagnosis  Obstructive sleep apnea    RECORDS REQUESTED FROM:       Clinic name Comments Records Status Imaging Status   [facility] 1/10/19 notes with Dr Alicea [facility] [place] 1/13/19 Sleep Study Interpretation  [facility] 2/2/17 notes with Anna COVARRUBIAS  [facility]  1/27/14 notes with Dr Meek Ellison  Epic

## 2019-01-16 NOTE — PROGRESS NOTES
Sleep Study Follow-Up Visit:    Date on this visit: 1/16/2019    Sleep study done on 1/13/19 at the [facility] for nocturnal enuresis x 2 weeks, snoring, witnessed apneas, excessive daytime sleepiness (ESS 23), obesity, narrowed oropharynx, tonsillar enlargement, nasal congestion.    Study Date: 1/13/2019 (182.0 lbs)   Diagnostic PSG  Sleep Architecture:   The total recording time of the polysomnogram was 191.6 minutes. The total sleep time was 168.5 minutes. Sleep latency was decreased at 0.0 minutes without the use of a sleep aid. REM latency was 120.6 minutes. Arousal index was increased at 85.5 arousals per hour. Sleep efficiency was normal at 88.0%. Wake after sleep onset was 14.0 minutes. The patient spent 2.7% of total sleep time in Stage N1, 73.0% in Stage N2, 13.4% in Stage N3, and 11.0% in REM. Time in REM supine was 0 minutes.     Respiration:     Events ? The polysomnogram revealed a presence of 84 obstructive, 6 central, and 6 mixed apneas resulting in an apnea index of 34.2 events per hour. There were 99 obstructive hypopneas and 0 central hypopneas resulting in an obstructive hypopnea index of 35.3 and central hypopnea index of 0 events per hour. The combined apnea/hypopnea index was 69.4 events per hour (central apnea/hypopnea index was 2.1 events per hour).  The REM AHI was 71.4 events per hour. The supine AHI was 94.3 events per hour. The RERA index was 11.4 events per hour. The RDI was 80.8 events per hour.    Snoring - was reported as loud.    Respiratory rate and pattern - was notable for normal respiratory rate and pattern.    Sustained Sleep Associated Hypoventilation - Transcutaneous carbon dioxide monitoring was not used, however significant hypoventilation was not suggested by oximetry    Sleep Associated Hypoxemia - (Greater than 5 minutes O2 sat at or below 88%) was present. Baseline oxygen saturation was 92.0%. Lowest oxygen saturation was 66.5%. Time spent  less than or equal to 88% was 23.8 minutes. Time spent less than or equal to 89% was 30.3 minutes.      Treatment PSG  Sleep Architecture:   At 02:31:09 AM the patient was placed on PAP treatment and was titrated at pressures ranging from CPAP 5 cmH2O up to CPAP 11 cmH2O. The total recording time of the treatment portion of the study was 183.7 minutes. The total sleep time was 171.0 minutes. During the treatment portion of the study the sleep latency was 7.5 minutes. REM latency was 30.0 minutes. Arousal index was 23.2 arousals per hour. Sleep efficiency was 93.1%. Wake after sleep onset was 2.0 minutes. The patient spent 1.8% of total sleep time in Stage N1, 35.4% in Stage N2, 39.5% in Stage N3, and 23.4% in REM. Time in REM supine was 11.5 minutes.      Respiration:    The final pressure was CPAP 11 cmH2O with an AHI of 7.0 events per hour. Time in REM supine on final pressure was 0 minutes. Lateral REM was seen ay 8cmH20    This titration was considered adequate (residual AHI with 75% decrease without REM?supine sleep at final pressure).     Movement Activity:     Periodic Limb Movements  ? During the diagnostic portion of the study, there were 0 PLMs recorded.   ? During the treatment portion of the study, there were 23 PLMs recorded. The PLM index was 8.1 movements per hour. The PLM Arousal Index was 1.4 per hour.    REM EMG Activity - Excessive transient/sustained muscle activity was not present.    Nocturnal Behavior - Abnormal sleep related behaviors were not noted. He did take off wires on awakening at the end of the study    Bruxism - None apparent.     Cardiac Summary:  Arrhythmias were not noted.    These findings were reviewed with patient.     Past medical/surgical history, family history, social history, medications and allergies were reviewed.      Problem List:  Patient Active Problem List    Diagnosis Date Noted     ROSI (obstructive sleep apnea)- severe (AHI 69) 01/14/2019     Priority: Medium      1/13/2019 [facility] Sleep Study (182.0 lbs) - AHI 69.4, RDI 80.8, Supine AHI 94.3, REM AHI 71.4, Low O2% 66.5%, Time Spent ?88% 23.8, Time Spent ?89% 30.3. Treatment was titrated to a pressure of CPAP 11 with an AHI 7.0. Time spent in REM supine at this pressure was 0 minutes.       Tobacco use disorder 08/19/2004     Priority: Medium     Chronic nasal congestion 01/10/2019     Priority: Low     Nocturnal enuresis 01/09/2019     Priority: Low     CARDIOVASCULAR SCREENING; LDL GOAL LESS THAN 160 10/31/2010     Priority: Low        Impression/Plan:    Severe obstructive sleep apnea   Recommend autoPAP 8-18 cm H20  He is interested in a surgical option for long-term solution. See Dr. Thompsno.     He will follow up with me in about 2 month(s).     Twenty-five minutes spent with patient, all of which were spent face-to-face counseling, consulting, coordinating plan of care.      Dieter Alicea     CC: Meek Ellison

## 2019-01-16 NOTE — PROGRESS NOTES
Called and spoke to patient. He was given the phone numebr to [facility] to make a cpap set up appt. He was also instructed to make a follow up appt with Deanne Poole 2 months after starting on Cpap. I gave him Dr. Thompson's phone number too to make a consult appt.

## 2019-01-16 NOTE — PROGRESS NOTES
"Norman Pardo is a 43 year old male who is being evaluated via a billable telephone visit.      The patient has been notified of following:     \"This telephone visit will be conducted via a call between you and your physician/provider. We have found that certain health care needs can be provided without the need for a physical exam.  This service lets us provide the care you need with a short phone conversation.  If a prescription is necessary we can send it directly to your pharmacy.  If lab work is needed we can place an order for that and you can then stop by our lab to have the test done at a later time.    If during the course of the call the physician/provider feels a telephone visit is not appropriate, you will not be charged for this service.\"     Consent has been obtained for this service by 1 care team member: yes. See the scanned image in the medical record.    "

## 2019-01-17 ENCOUNTER — OFFICE VISIT (OUTPATIENT)
Dept: OTOLARYNGOLOGY | Facility: CLINIC | Age: 44
End: 2019-01-17

## 2019-01-17 ENCOUNTER — PRE VISIT (OUTPATIENT)
Dept: OTOLARYNGOLOGY | Facility: CLINIC | Age: 44
End: 2019-01-17

## 2019-01-17 DIAGNOSIS — G47.33 OSA (OBSTRUCTIVE SLEEP APNEA): Primary | ICD-10-CM

## 2019-01-17 NOTE — LETTER
2019       RE: Norman Pardo  [address]  [place] [address]     Dear Colleague,    Thank you for referring your patient, Norman Pardo, to the [facility] at [facility]. Please see a copy of my visit note below.        SLEEP SURGERY CONSULTATION    Patient: Norman Pardo  : 1975  CHIEF COMPLAINT: ROSI    IDENTIFICATION: Dr. Alicea consulted Dr. Thompson for surgical evaluation and possible treatment of obstructive sleep apnea syndrome for Norman Pardo.    HPI:  Norman Pardo is a 43 year old year old male who has Obstructive Sleep Apnea.  Patient had a sleep study performed at [facility] on 2019.  Overall AHI was 69.4.  Supine AHI was 94.  Nonsupine was 68.4.  Patient has not yet started CPAP.  He initially came in to have his sleep evaluated due to witnessed apneas and nocturnal enuresis.  Additionally he has severe daytime sleepiness with an [place] Sleepiness Scale of 23 out of 24.  Patient is a loud snorer.  Today he denies any nasal congestion.  He was referred to ENT due to tonsillar hypertrophy seen on examination.       PAST MEDICAL HISTORY:  Past Medical History:   Diagnosis Date     Chest pain, non-cardiac 2012     REYES (dyspnea on exertion) 2012     Neck injury 2010       PAST SURGICAL HISTORY:  Past Surgical History:   Procedure Laterality Date     HC INJ EPIDURAL CERVICAL/THORACIC W/WO CONTRAST       ORTHOPEDIC SURGERY      right knee (3 inch nail through patella)       MEDICATIONS:  Current Outpatient Medications   Medication Sig Dispense Refill     order for DME autoPAP 8-18 cmH20 1 Device 0       ALLERGIES:  Allergies   Allergen Reactions     Erythromycin Swelling     Penicillins Swelling       SOCIAL HISTORY:  Social History     Socioeconomic History     Marital status:      Spouse name: Julieta     Number of children: 2     Years of education: 12     Highest  education level: Not on file   Social Needs     Financial resource strain: Not on file     Food insecurity - worry: Not on file     Food insecurity - inability: Not on file     Transportation needs - medical: Not on file     Transportation needs - non-medical: Not on file   Occupational History     Employer: SELF     Comment: [employer] [place]     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   Tobacco Use     Smoking status: Former Smoker     Packs/day: 1.00     Types: Cigarettes     Smokeless tobacco: Current User     Types: Chew   Substance and Sexual Activity     Alcohol use: Yes     Frequency: Monthly or less     Comment: Ocassional     Drug use: No     Sexual activity: Yes     Partners: Female   Other Topics Concern      Service Yes     Comment: [employer]     Blood Transfusions No     Caffeine Concern Not Asked     Occupational Exposure Not Asked     Hobby Hazards Not Asked     Sleep Concern No     Stress Concern No     Weight Concern No     Special Diet Not Asked     Back Care No     Exercise Yes     Comment: Active with construction     Bike Helmet Not Asked     Seat Belt Yes     Self-Exams Not Asked     Parent/sibling w/ CABG, MI or angioplasty before 65F 55M? No   Social History Narrative     Not on file       FAMILY HISTORY:  Family History   Adopted: Yes       REVIEW OF SYSTEMS:  [facility] ROS 1/17/2019   Constitutional Weight gain   Ears, Nose, Throat Ear pain, Nasal congestion or drainage, Sore throat   Cardiopulmonary Chest pain   Gastrointestinal/Genitourinary Heartburn/indigestion   Musculoskeletal Sore or stiff joints   Endocrine Frequent urination         PHYSICAL EXAM  There were no vitals taken for this visit.    Constitutional: healthy, alert and no distress  ENT:   MOUTH:   MMM 3, Tonsils 4+, crowded oropharynx with redundant tissue    ASSESSMENT:  1.  Severe obstructive sleep apnea,  untreated    Incompletely treated sleep apnea elevates risk of  death, cardiovascular disease, and motor vehicle crashes, and it creates deficits in daytime function and quality of life.  Surgical treatment can improve these clinically important outcomes; therefore surgical treatment is medically necessary if the patient is not using CPAP adequately.    2.  Patient's anatomic abnormalities related to sleep apnea include:    Elliott tonsillar hypertrophy       PLAN:  1.  We discussed obstructive sleep apnea, including pathophysiology and consequences.  We provided the patient with written information about obstructive sleep apnea and its management.  We discussed the beneficial relationship between weight loss and sleep apnea.      2.    We discussed the goals of oropharyngeal surgery are to improve oropharyngeal airway at the level of the velopharynx.  Patient understands that isolated palate surgery alone may not always decrease the severity obstructive sleep apnea.  We advocate a multilevel approach in order to decrease the severity of obstructive sleep apnea.  We aim to improve the nighttime oropharyngeal airway, improve daytime symptoms of obstructive sleep apnea, and potentially facilitate the effectiveness of CPAP therapy by decreasing pressure requirements.  We discussed the expected course of one to two night stay in the hospital, two to three weeks of throat pain, and two to three weeks of pureed/soft diet.  Patient may have temporary dysphagia, voice change, velopharyngeal insufficiency, and tongue numbness.    Patient was taught what is included and excluded in a soft diet.  Patient advised to take one to two weeks off from work.  Post-surgical medications will include a narcotic for pain control, prednisone for post-operative edema.      We discussed risks, including but not limited to post-operative bleeding (immediate and delayed), nasopharyngeal stenosis, residual obstruction, velopharyngeal insufficiency, dry throat sensation, infection, and others.      Given  the size of his tonsils patient would benefit greatly from a tonsillectomy in my opinion.  I did discuss with him that it might not be a cure though.  In the meantime I would encourage the patient to start CPAP use while we talked to his insurance company to obtain a prior authorization for tonsillectomy and UPPP.    I spent 35 minutes face-to-face with Norman Pardo during today's office visit, of which more than 50% was spent on counseling and coordination of care, which included discussion of pathophysiology of patient's obstructive sleep apnea, treatment options, risks and benefits of each option.      Again, thank you for allowing me to participate in the care of your patient.      Sincerely,    Purvi Thompson MD

## 2019-01-17 NOTE — PROGRESS NOTES
SLEEP SURGERY CONSULTATION    Patient: Norman Pardo  : 1975  CHIEF COMPLAINT: ROSI    IDENTIFICATION: Dr. Alicea consulted Dr. Thompson for surgical evaluation and possible treatment of obstructive sleep apnea syndrome for Norman Pardo.    HPI:  Norman Pardo is a 43 year old year old male who has Obstructive Sleep Apnea.  Patient had a sleep study performed at [facility] on 2019.  Overall AHI was 69.4.  Supine AHI was 94.  Nonsupine was 68.4.  Patient has not yet started CPAP.  He initially came in to have his sleep evaluated due to witnessed apneas and nocturnal enuresis.  Additionally he has severe daytime sleepiness with an [place] Sleepiness Scale of 23 out of 24.  Patient is a loud snorer.  Today he denies any nasal congestion.  He was referred to ENT due to tonsillar hypertrophy seen on examination.       PAST MEDICAL HISTORY:  Past Medical History:   Diagnosis Date     Chest pain, non-cardiac 2012     REYES (dyspnea on exertion) 2012     Neck injury 2010       PAST SURGICAL HISTORY:  Past Surgical History:   Procedure Laterality Date     HC INJ EPIDURAL CERVICAL/THORACIC W/WO CONTRAST       ORTHOPEDIC SURGERY      right knee (3 inch nail through patella)       MEDICATIONS:  Current Outpatient Medications   Medication Sig Dispense Refill     order for DME autoPAP 8-18 cmH20 1 Device 0       ALLERGIES:  Allergies   Allergen Reactions     Erythromycin Swelling     Penicillins Swelling       SOCIAL HISTORY:  Social History     Socioeconomic History     Marital status:      Spouse name: Julieta     Number of children: 2     Years of education: 12     Highest education level: Not on file   Social Needs     Financial resource strain: Not on file     Food insecurity - worry: Not on file     Food insecurity - inability: Not on file     Transportation needs - medical: Not on file     Transportation needs - non-medical: Not on file   Occupational  History     Employer: SELF     Comment: [employer]     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   Tobacco Use     Smoking status: Former Smoker     Packs/day: 1.00     Types: Cigarettes     Smokeless tobacco: Current User     Types: Chew   Substance and Sexual Activity     Alcohol use: Yes     Frequency: Monthly or less     Comment: Ocassional     Drug use: No     Sexual activity: Yes     Partners: Female   Other Topics Concern      Service Yes     Comment: [employer]     Blood Transfusions No     Caffeine Concern Not Asked     Occupational Exposure Not Asked     Hobby Hazards Not Asked     Sleep Concern No     Stress Concern No     Weight Concern No     Special Diet Not Asked     Back Care No     Exercise Yes     Comment: Active with construction     Bike Helmet Not Asked     Seat Belt Yes     Self-Exams Not Asked     Parent/sibling w/ CABG, MI or angioplasty before 65F 55M? No   Social History Narrative     Not on file       FAMILY HISTORY:  Family History   Adopted: Yes       REVIEW OF SYSTEMS:  [facility] ROS 1/17/2019   Constitutional Weight gain   Ears, Nose, Throat Ear pain, Nasal congestion or drainage, Sore throat   Cardiopulmonary Chest pain   Gastrointestinal/Genitourinary Heartburn/indigestion   Musculoskeletal Sore or stiff joints   Endocrine Frequent urination         PHYSICAL EXAM  There were no vitals taken for this visit.    Constitutional: healthy, alert and no distress  ENT:   MOUTH:   MMM 3, Tonsils 4+, crowded oropharynx with redundant tissue    ASSESSMENT:  1.  Severe obstructive sleep apnea,  untreated    Incompletely treated sleep apnea elevates risk of death, cardiovascular disease, and motor vehicle crashes, and it creates deficits in daytime function and quality of life.  Surgical treatment can improve these clinically important outcomes; therefore surgical treatment is medically necessary if the patient is not using CPAP  adequately.    2.  Patient's anatomic abnormalities related to sleep apnea include:    [place] tonsillar hypertrophy       PLAN:  1.  We discussed obstructive sleep apnea, including pathophysiology and consequences.  We provided the patient with written information about obstructive sleep apnea and its management.  We discussed the beneficial relationship between weight loss and sleep apnea.      2.    We discussed the goals of oropharyngeal surgery are to improve oropharyngeal airway at the level of the velopharynx.  Patient understands that isolated palate surgery alone may not always decrease the severity obstructive sleep apnea.  We advocate a multilevel approach in order to decrease the severity of obstructive sleep apnea.  We aim to improve the nighttime oropharyngeal airway, improve daytime symptoms of obstructive sleep apnea, and potentially facilitate the effectiveness of CPAP therapy by decreasing pressure requirements.  We discussed the expected course of one to two night stay in the hospital, two to three weeks of throat pain, and two to three weeks of pureed/soft diet.  Patient may have temporary dysphagia, voice change, velopharyngeal insufficiency, and tongue numbness.    Patient was taught what is included and excluded in a soft diet.  Patient advised to take one to two weeks off from work.  Post-surgical medications will include a narcotic for pain control, prednisone for post-operative edema.      We discussed risks, including but not limited to post-operative bleeding (immediate and delayed), nasopharyngeal stenosis, residual obstruction, velopharyngeal insufficiency, dry throat sensation, infection, and others.      Given the size of his tonsils patient would benefit greatly from a tonsillectomy in my opinion.  I did discuss with him that it might not be a cure though.  In the meantime I would encourage the patient to start CPAP use while we talked to his insurance company to obtain a prior  authorization for tonsillectomy and UPPP.    I spent 35 minutes face-to-face with Norman Pardo during today's office visit, of which more than 50% was spent on counseling and coordination of care, which included discussion of pathophysiology of patient's obstructive sleep apnea, treatment options, risks and benefits of each option.

## 2019-01-25 ENCOUNTER — TELEPHONE (OUTPATIENT)
Dept: OTOLARYNGOLOGY | Facility: CLINIC | Age: 44
End: 2019-01-25

## 2019-01-25 NOTE — TELEPHONE ENCOUNTER
Called and left a message for the patient.  We are out-of-network with the patient's insurance which means surgery could be very expensive out of pocket cost to him.  I called and explained that to him.  I recommended that he see me at [facility] ENT clinic and gave him the phone number.

## 2019-01-30 ENCOUNTER — DOCUMENTATION ONLY (OUTPATIENT)
Dept: SLEEP MEDICINE | Facility: CLINIC | Age: 44
End: 2019-01-30

## 2019-01-30 DIAGNOSIS — G47.33 OSA (OBSTRUCTIVE SLEEP APNEA): Primary | ICD-10-CM

## 2019-01-30 DIAGNOSIS — G47.33 OSA (OBSTRUCTIVE SLEEP APNEA): ICD-10-CM

## 2019-01-30 NOTE — PROGRESS NOTES
Patient was offered choice of vendor and chose [facility].  Patient Norman Pardo was set up at [facility] on January 30, 2019. Patient received a [employer] DreamStation Auto. Pressures were set at 8-18 cm H2O.   Patient s ramp is 5 cm H2O for Auto and FLEX/EPR is A Flex.  Patient received a Resmed Mask name: AIRFIT F20  Full Face mask size Medium, heated tubing and heated humidifier.  Patient is enrolled in the STM Program and does not need to meet compliance. Patient DID NOT HAVE TIME TO SCHEDULE F/U.  I WILL CALL HIM LATER TO SCHEDULE F/U.    Marcie Houston

## 2019-02-04 ENCOUNTER — DOCUMENTATION ONLY (OUTPATIENT)
Dept: SLEEP MEDICINE | Facility: CLINIC | Age: 44
End: 2019-02-04

## 2019-02-04 DIAGNOSIS — G47.33 OSA (OBSTRUCTIVE SLEEP APNEA): ICD-10-CM

## 2019-02-04 NOTE — PROGRESS NOTES
3 DAY STM VISIT    Diagnostic AHI: 69.4   PSG    Patient contacted for 3 day STM visit  Message left for patient to return call     Device type: Auto-CPAP  PAP settings from order::  CPAP min 8 cm  H20       CPAP max 18 cm  H20        Mask type:    Full Face Mask     Device settings from machine      Min CPAP 8            Max CPAP 18            Assessment: Nightly usage over four hours.  Action plan: Pt to have f/u 14 day STM visit.  Patient has a follow up visit scheduled:   not required by insurance.

## 2019-02-14 ENCOUNTER — DOCUMENTATION ONLY (OUTPATIENT)
Dept: SLEEP MEDICINE | Facility: CLINIC | Age: 44
End: 2019-02-14

## 2019-02-14 DIAGNOSIS — G47.33 OSA (OBSTRUCTIVE SLEEP APNEA): ICD-10-CM

## 2019-02-14 NOTE — PROGRESS NOTES
14 DAY STM VISIT    Diagnostic AHI: 69.4   PSG      Message left for patient to return call     Assessment: Pt not meeting objective benchmarks for AHI and compliance       Action plan: waiting for patient to return call.  and pt to have 30 day STM visit.    Device type: Auto-CPAP    PAP settings: CPAP min 8 cm  H20       CPAP max 18 cm  H20       90th % hvdbhsny28.1 cm  H20    Mask type:  Full Face Mask    Objective measures: 14 day rolling measures         Compliance  50 %     % of night spent in large leak  5 % last  upload      AHI 8.84   last  upload      Average number of minutes 216          Objective measure goal  Compliance   Goal >70%  Leak   Goal < 10%  AHI  Goal < 5  Usage  Goal >240

## 2019-03-05 ENCOUNTER — DOCUMENTATION ONLY (OUTPATIENT)
Dept: SLEEP MEDICINE | Facility: CLINIC | Age: 44
End: 2019-03-05

## 2019-03-05 DIAGNOSIS — G47.33 OSA (OBSTRUCTIVE SLEEP APNEA): ICD-10-CM

## 2019-03-05 NOTE — PROGRESS NOTES
30 DAY [facility] VISIT    Diagnostic AHI: 69.4   PSG      Message left for patient to return call     Assessment: Pt not meeting objective benchmarks for AHI and compliance     Action plan: waiting for patient to return call.   Patient has notscheduled a follow up visit with Dr. Alicea. 2 wk [facility] visit scheduled.      Device type: Auto-CPAP  PAP settings: CPAP min 8 cm  H20     CPAP max 18 cm  H20       90th % ivqrmvlq57.3 cm  H20    Mask type:  Full Face Mask    Objective measures: 14 day rolling measures         Compliance  21 %     % of night spent in large leak  17 % last  upload      AHI 8.55   last  upload      Average number of minutes 187          Objective measure goal  Compliance   Goal >70%  Leak   Goal < 10%  AHI  Goal < 5  Usage  Goal >240

## 2019-03-26 ENCOUNTER — DOCUMENTATION ONLY (OUTPATIENT)
Dept: SLEEP MEDICINE | Facility: CLINIC | Age: 44
End: 2019-03-26

## 2019-03-26 DIAGNOSIS — G47.33 OSA (OBSTRUCTIVE SLEEP APNEA): ICD-10-CM

## 2019-03-26 NOTE — Clinical Note
HI this patient did not have a follow up visit scheduled and has not returned STM calls.  AHI was 69.4 at [facility].      Melva

## 2019-03-26 NOTE — PROGRESS NOTES
STM recheck     Diagnostic AHI: 69.4   PSG    Data only recheck pt has not returned any STM calls.  Letter sent requesting contact.     Assessment: Pt not meeting objective benchmarks for compliance       Action plan: letter sent requesting contact       Device type: Auto-CPAP    PAP settings: CPAP min 8 cm  H20       CPAP max 18 cm  H20         Mask type:  Full Face Mask    Objective measures: 14 day rolling measures            Objective measure goal  Compliance   Goal >70%  Leak   Goal < 24 lpm  AHI  Goal < 5  Usage  Goal >240

## 2019-03-26 NOTE — LETTER
[facility]  March 26, 2019    Norman Pardo  [address] [place] [address]  [id number]  Dear Norman,      Our records show that your sleep apnea is not being effectively treated with CPAP. Since reducing health risks and improving symptoms requires effective treatment of sleep apnea, we would like to help you get a treatment that is acceptable and works for you.      We can help improve your comfort with CPAP and its effectiveness. We can also help you to find alternative treatments if that is your preference. Many of these alternatives to CPAP can be effective and may make sleep more comfortable, particularly if  you find it difficult to use a mask. Options include oral devices, weight loss, sleep positioning devices, surgery, and airway simulation devices. Sometimes a combination of therapies can be effective.   Everyone is different, so the next step in finding out what is best for you is for you to get in touch with us.    Since we have been unable to reach you to discuss this by phone, we would ask that you call [phone] [place],  [phone] Melva or Liliana at [phone]. We can help you find the right option for you to better manage your sleep apnea.    Sincerely,    GURWINDER Mallory, [facility], [facility]    Virtual Care Coordinator    [facility]    [phone]

## 2019-06-28 ENCOUNTER — TELEPHONE (OUTPATIENT)
Dept: FAMILY MEDICINE | Facility: OTHER | Age: 44
End: 2019-06-28

## 2019-06-28 NOTE — TELEPHONE ENCOUNTER
Panel Management Review      Patient has the following on his problem list: None      Composite cancer screening  Chart review shows that this patient is due/due soon for the following None  Summary:    Patient is due/failing the following:   PHYSICAL    Action needed:   Patient needs office visit for Physical.  Patient needs fasting labs completed    Type of outreach:    Phone, left message for patient to call back.     Questions for provider review:    None                                                                                                                                    Lynette Holman CMA (AAMA)       Chart routed to Care Team .

## 2019-06-28 NOTE — LETTER
[address]   [facility] [address]  Phone: [phone]  July 1, 2019      Norman Pardo  [address] [facility] [address]      Dear Norman,    We care about your health and have reviewed your health plan including your medical conditions, medications, and lab results.  Based on this review, it is recommended that you follow up regarding the following health topic(s):  -Cholesterol  -Wellness (Physical) Visit     We recommend you take the following action(s):  -schedule a WELLNESS (Physical) APPOINTMENT.  We will perform the following labs: Lipids (fasting cholesterol - nothing to eat except water and/or meds for 8-10 hours).     Please call us at the [facility] - [phone] (or use [facility]) to address the above recommendations.     Thank you for trusting [facility] and we appreciate the opportunity to serve you.  We look forward to supporting your healthcare needs in the future.    Healthy Regards,    Your Health Care Team  [facility]

## 2019-07-01 NOTE — TELEPHONE ENCOUNTER
Left message for patient to call back. Please see message below.  Sent letter as well.  Amelia Araiza, WILLIAM

## 2019-07-29 ENCOUNTER — DOCUMENTATION ONLY (OUTPATIENT)
Dept: SLEEP MEDICINE | Facility: CLINIC | Age: 44
End: 2019-07-29

## 2019-07-29 DIAGNOSIS — G47.33 OSA (OBSTRUCTIVE SLEEP APNEA): ICD-10-CM

## 2019-12-26 ENCOUNTER — TELEPHONE (OUTPATIENT)
Dept: FAMILY MEDICINE | Facility: CLINIC | Age: 44
End: 2019-12-26

## 2019-12-26 NOTE — TELEPHONE ENCOUNTER
Reason for call:  Symptom  Reason for call:  Patient reporting a symptom    Symptom or request: headaches, congestion in sinus and chest    Duration (how long have symptoms been present): past year    Have you been treated for this before? Yes    Additional comments: Pt has been seen maybe once or twice for this and he said that he has had this for the past year. Very congested in chest and head and sinus. Please advise. Pt would like to be seen today or tomorrow.     Phone Number patient can be reached at:  Cell number on file:    Telephone Information:   Mobile [phone]       Best Time:  anytime    Can we leave a detailed message on this number:  YES    Call taken on 12/26/2019 at 2:32 PM by Kisha Correa

## 2020-05-17 ENCOUNTER — VIRTUAL VISIT (OUTPATIENT)
Dept: FAMILY MEDICINE | Facility: OTHER | Age: 45
End: 2020-05-17

## 2020-05-17 NOTE — PROGRESS NOTES
"Date: 2020 10:14:57  Clinician: Megan Powell  Clinician NPI: [id number]  Patient: Sam Pardo  Patient : 1975  Patient Address: [address] [place] [address], [place] [address]  Patient Phone: [phone]  Visit Protocol: URI  Patient Summary:  Sam is a 44 year old ( : 1975 ) male who initiated a Visit for COVID-19 (Coronavirus) evaluation and screening. When asked the question \"Please sign me up to receive news, health information and promotions from [employer].\", Sam responded \"No\".    Sam states his symptoms started suddenly 3-4 days ago. After his symptoms started, they improved and then got worse again.   His symptoms consist of ageusia, diarrhea, chills, a cough, nasal congestion, a headache, malaise, myalgia, and anosmia. Sam also feels feverish.   Symptom details     Nasal secretions: The color of his mucus is clear.    Cough: Sam coughs a few times an hour and his cough is more bothersome at night. Phlegm does not come into his throat when he coughs. He does not believe his cough is caused by post-nasal drip.     Temperature: His current temperature is 101.2 degrees Fahrenheit. Sam has had a temperature over 100 degrees Fahrenheit for 3-4 days.     Headache: He states the headache is severe (7-9 on a 10 point pain scale).      Sam denies having nausea, teeth pain, facial pain or pressure, sore throat, wheezing, vomiting, rhinitis, and ear pain. He also denies having recent facial or sinus surgery in the past 60 days, taking antibiotic medication for the symptoms, and having a sinus infection within the past year.   Precipitating events  He has not recently been exposed to someone with influenza. Sam has not been in close contact with any high risk individuals.   Pertinent COVID-19 (Coronavirus) information  In the past 14 days, Sam has not worked in a congregate living setting.   He does not work or volunteer as healthcare worker or a  and does not work or volunteer " in a healthcare facility.   Sam also has not lived in a congregate living setting in the past 14 days. He does not live with a healthcare worker.   Sam has not had a close contact with a laboratory-confirmed COVID-19 patient within 14 days of symptom onset.   Triage Point(s) temporarily suspended for COVID-19 (Coronavirus) screening  Sam reported the following symptoms which were previously protocol referral points. These protocol referral points have temporarily been removed for purposes of COVID-19 (Coronavirus) screening.   Difficulty breathing even when resting and can only speak in phrase(s)   Pertinent medical history  Sam needs a return to work/school note.   Weight: 172 lbs   Sam smokes or uses smokeless tobacco.   Additional information as reported by the patient (free text): I'm super dizzy all the time and can't get warm.  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   Weight: 172 lbs    MEDICATIONS: Sudafed PE Pressure-Pain oral, ALLERGIES: Penicillins, erythromycin base  Clinician Response:  Dear Sam,   Dear Sam  Take tylenol for fever/chills. Drink plenty of fluids. Rest.&nbsp;  Your symptoms show that you may have coronavirus (COVID-19). This illness can cause fever, cough and trouble breathing. Many people get a mild case and get better on their own. Some people can get very sick.   Based on the symptoms you have shared I would like you to be re-checked in 2-3 days. Please call your clinic to make a video or phone appointment with your primary doctor.  Will I be tested for COVID-19?  We are not testing most patients at this time. You may asked your primary care doctor to be tested if:  You are very ill. For example, you're on chemotherapy, dialysis or home hospice care. (Contact your specialty clinic or program.)  You live in a nursing home or other long-term care facility. (Talk to your nurse manager or medical director.)  You're a health care worker. ([facility] gely  "contact our employee health office for testing.)  How can I protect others?  Without a test, we can't know for sure that you have COVID-19. For safety, it's very important to follow these rules.  First, stay home and away from others (self-isolate) until:  SSYou've had no fever---and no medicine that reduces fever---for 3 full days (72 hours). And...  SSYour other symptoms have gotten better. For example, your cough or breathing has improved. And...  SSAt least 10 days have passed since your symptoms started.  During this time:   Don't let anyone visit. Stay in your own room (and use your own bathroom), if you can.   Stay away from others in your home. No hugging, kissing or shaking hands.   Don't let anyone visit.   Don't go to work, school or anywhere else.    Clean \"high touch\" surfaces often (doorknobs, counters, handles, etc.). Use a household cleaning spray or wipes.   Cover your mouth and nose with a mask, tissue or washcloth to avoid spreading germs.   Wash your hands and face often. Use soap and water.   How can I take care of myself?  1)Take Tylenol (acetaminophen) for fever or pain. If you have liver or kidney problems, ask your family doctor if it's okay to take Tylenol.  a)Adults can take either:  650 mg (two 325 mg pills) every 4 to 6 hours, or...  1,000 mg (two 500 mg pills) every 8 hours as needed. Note: Don't take more than 3,000 mg in one day.  For children, check the Tylenol bottle for the right dose. The dose is based on the child's age or weigh  2)  If you have other health problems (like cancer, heart failure, an organ transplant or severe kidney disease): Call your specialty clinic if you don't feel better in the next 2 days.  3) Know when to call 911: If your breathing is so bad that it keeps you from doing normal activities, call 911 or go to the emergency room. Tell them that you've been staying home and may have COVID-19.  Where can I get more information?  To learn more about COVID-19 and " how to care for yourself at home, please visit the CDC website at https://www.cdc.gov/coronavirus/2019-ncov/about/steps-when-sick.html.  For more about your care at [facility], please visit [email or web address]  If you are interested in becoming part of a [facility] clinic trial related to COVID19 please go to [email or web address] for information, if you qualify.    Diagnosis: Cough  Diagnosis ICD: R05
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
